# Patient Record
Sex: FEMALE | Race: WHITE | NOT HISPANIC OR LATINO | Employment: FULL TIME | URBAN - METROPOLITAN AREA
[De-identification: names, ages, dates, MRNs, and addresses within clinical notes are randomized per-mention and may not be internally consistent; named-entity substitution may affect disease eponyms.]

---

## 2024-08-22 ENCOUNTER — TELEPHONE (OUTPATIENT)
Dept: SURGERY | Facility: CLINIC | Age: 49
End: 2024-08-22
Payer: OTHER GOVERNMENT

## 2024-08-22 NOTE — TELEPHONE ENCOUNTER
Left message offering sooner appt on 9/9 at 2:20p for patient. Patient is new to [facility] and we will need medical records, surgical and colonoscopy records.

## 2024-08-23 ENCOUNTER — TELEPHONE (OUTPATIENT)
Dept: SURGERY | Facility: CLINIC | Age: 49
End: 2024-08-23
Payer: OTHER GOVERNMENT

## 2024-08-23 NOTE — TELEPHONE ENCOUNTER
----- Message from Emanuel Macias sent at 8/23/2024 10:18 AM CDT -----  Regarding: Missed call  Contact: [phone]  Caller is returning a missed called from office. She said she will take the earlier appointment on 09-09-24 Please call back as soon as possible.

## 2024-09-06 ENCOUNTER — TELEPHONE (OUTPATIENT)
Dept: SURGERY | Facility: CLINIC | Age: 49
End: 2024-09-06
Payer: OTHER GOVERNMENT

## 2024-09-06 NOTE — TELEPHONE ENCOUNTER
Called patient on 9/6/24 to confirm her appointment with Dr. Thornton on 9/9/24 at 2:20 PM in [facility] ([facility], 4th floor). Patient expressed understanding of appointment's date, time and location. Patient stated she would be there.

## 2024-09-09 ENCOUNTER — LAB VISIT (OUTPATIENT)
Dept: LAB | Facility: HOSPITAL | Age: 49
End: 2024-09-09
Attending: COLON & RECTAL SURGERY
Payer: OTHER GOVERNMENT

## 2024-09-09 ENCOUNTER — OFFICE VISIT (OUTPATIENT)
Dept: SURGERY | Facility: CLINIC | Age: 49
End: 2024-09-09
Payer: OTHER GOVERNMENT

## 2024-09-09 ENCOUNTER — PATIENT MESSAGE (OUTPATIENT)
Dept: SURGERY | Facility: CLINIC | Age: 49
End: 2024-09-09
Payer: OTHER GOVERNMENT

## 2024-09-09 VITALS
WEIGHT: 161.19 LBS | BODY MASS INDEX: 23.07 KG/M2 | RESPIRATION RATE: 16 BRPM | DIASTOLIC BLOOD PRESSURE: 72 MMHG | HEIGHT: 70 IN | SYSTOLIC BLOOD PRESSURE: 122 MMHG | HEART RATE: 97 BPM

## 2024-09-09 DIAGNOSIS — Z87.19 HISTORY OF DIVERTICULITIS: Primary | ICD-10-CM

## 2024-09-09 DIAGNOSIS — Z87.19 HISTORY OF DIVERTICULITIS: ICD-10-CM

## 2024-09-09 DIAGNOSIS — R10.2 PELVIC PAIN: ICD-10-CM

## 2024-09-09 LAB
ALBUMIN SERPL BCP-MCNC: 4.1 G/DL (ref 3.5–5.2)
ALP SERPL-CCNC: 50 U/L (ref 55–135)
ALT SERPL W/O P-5'-P-CCNC: 17 U/L (ref 10–44)
ANION GAP SERPL CALC-SCNC: 9 MMOL/L (ref 8–16)
AST SERPL-CCNC: 18 U/L (ref 10–40)
BASOPHILS # BLD AUTO: 0.06 K/UL (ref 0–0.2)
BASOPHILS NFR BLD: 0.7 % (ref 0–1.9)
BILIRUB SERPL-MCNC: 0.6 MG/DL (ref 0.1–1)
BUN SERPL-MCNC: 13 MG/DL (ref 6–20)
CALCIUM SERPL-MCNC: 9.4 MG/DL (ref 8.7–10.5)
CHLORIDE SERPL-SCNC: 107 MMOL/L (ref 95–110)
CO2 SERPL-SCNC: 24 MMOL/L (ref 23–29)
CREAT SERPL-MCNC: 0.8 MG/DL (ref 0.5–1.4)
CRP SERPL-MCNC: <0.3 MG/L (ref 0–8.2)
DIFFERENTIAL METHOD BLD: NORMAL
EOSINOPHIL # BLD AUTO: 0.2 K/UL (ref 0–0.5)
EOSINOPHIL NFR BLD: 2.3 % (ref 0–8)
ERYTHROCYTE [DISTWIDTH] IN BLOOD BY AUTOMATED COUNT: 12.7 % (ref 11.5–14.5)
EST. GFR  (NO RACE VARIABLE): >60 ML/MIN/1.73 M^2
GLUCOSE SERPL-MCNC: 82 MG/DL (ref 70–110)
HCT VFR BLD AUTO: 40 % (ref 37–48.5)
HGB BLD-MCNC: 13.4 G/DL (ref 12–16)
IMM GRANULOCYTES # BLD AUTO: 0.02 K/UL (ref 0–0.04)
IMM GRANULOCYTES NFR BLD AUTO: 0.2 % (ref 0–0.5)
LYMPHOCYTES # BLD AUTO: 2.5 K/UL (ref 1–4.8)
LYMPHOCYTES NFR BLD: 28.4 % (ref 18–48)
MCH RBC QN AUTO: 30 PG (ref 27–31)
MCHC RBC AUTO-ENTMCNC: 33.5 G/DL (ref 32–36)
MCV RBC AUTO: 90 FL (ref 82–98)
MONOCYTES # BLD AUTO: 0.8 K/UL (ref 0.3–1)
MONOCYTES NFR BLD: 9 % (ref 4–15)
NEUTROPHILS # BLD AUTO: 5.2 K/UL (ref 1.8–7.7)
NEUTROPHILS NFR BLD: 59.4 % (ref 38–73)
NRBC BLD-RTO: 0 /100 WBC
PLATELET # BLD AUTO: 271 K/UL (ref 150–450)
PMV BLD AUTO: 11 FL (ref 9.2–12.9)
POTASSIUM SERPL-SCNC: 3.8 MMOL/L (ref 3.5–5.1)
PROT SERPL-MCNC: 6.7 G/DL (ref 6–8.4)
RBC # BLD AUTO: 4.46 M/UL (ref 4–5.4)
SODIUM SERPL-SCNC: 140 MMOL/L (ref 136–145)
WBC # BLD AUTO: 8.7 K/UL (ref 3.9–12.7)

## 2024-09-09 PROCEDURE — 99213 OFFICE O/P EST LOW 20 MIN: CPT | Mod: PBBFAC | Performed by: COLON & RECTAL SURGERY

## 2024-09-09 PROCEDURE — 85025 COMPLETE CBC W/AUTO DIFF WBC: CPT | Performed by: COLON & RECTAL SURGERY

## 2024-09-09 PROCEDURE — 99203 OFFICE O/P NEW LOW 30 MIN: CPT | Mod: S$PBB,,, | Performed by: COLON & RECTAL SURGERY

## 2024-09-09 PROCEDURE — 99999 PR PBB SHADOW E&M-EST. PATIENT-LVL III: CPT | Mod: PBBFAC,,, | Performed by: COLON & RECTAL SURGERY

## 2024-09-09 PROCEDURE — 80053 COMPREHEN METABOLIC PANEL: CPT | Performed by: COLON & RECTAL SURGERY

## 2024-09-09 PROCEDURE — 36415 COLL VENOUS BLD VENIPUNCTURE: CPT | Performed by: COLON & RECTAL SURGERY

## 2024-09-09 PROCEDURE — 86140 C-REACTIVE PROTEIN: CPT | Performed by: COLON & RECTAL SURGERY

## 2024-09-09 RX ORDER — SEMAGLUTIDE 1 MG/.5ML
1 INJECTION, SOLUTION SUBCUTANEOUS
COMMUNITY
Start: 2024-07-18

## 2024-09-09 RX ORDER — EZETIMIBE 10 MG/1
TABLET ORAL
COMMUNITY
Start: 2024-02-19

## 2024-09-09 RX ORDER — TIZANIDINE 4 MG/1
4 TABLET ORAL 2 TIMES DAILY PRN
COMMUNITY
Start: 2024-08-13

## 2024-09-09 RX ORDER — ATORVASTATIN CALCIUM 40 MG/1
TABLET, FILM COATED ORAL
COMMUNITY

## 2024-09-09 NOTE — PROGRESS NOTES
"CRS Office Visit    SUBJECTIVE:     Chief Complaint: ? Anastomotic Leak    History of Present Illness:  Patient is a 48 y.o. female presents with abdominal pain and ahistory of a large diverticulum vs. Chronic anastomotic leak from a prior colectomy. The patient is a new patient to this practice.     Her story began in 2017 when she was suffering from recurrent diverticulitis boughts ultimately leading to a laparoscopic sigmoid colectomy with Dr. Beltran in [place], [place]. She recovered well from that surgery and then underwent a tubal ligation in 2019, where a "large cyst" was noted at the level of the colorectal anastomosis, for which she was referred to a general surgeon for further evaluation. Two years later, she underwent evaluation with a CT scan and barium enema, which revealed a 4x2 cm diverticulum vs. Walled off anastomotic leak vs. Baker-type anastomosis. The surgeon performed a flexible sigmoidoscopy, which revealed a possible side to end anastomosis, but was not able to visualize the large diverticulum.     Given that the patient was asymptomatic through all of this, she then left things alone for the following years, until early August 2024 when she experienced a change and began having LLQ and back pain, feelings of tenesmus, pelvic pressure, and abdominal discomfort. She has continued to have normal bowel movements and is not constipated. She denies nausea, vomiting, changes in weight, fevers, chills, or other constitutional symptoms.     She presents today for further evaluation for this issue with copies of records from the outside institution.     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  No intraperitoneal air in indicate perforation.  Diverticulosis with wall thickening in the " "descending and sigmoid colon as well as the rectum with no adjacent stranding.  This is likely scarring however mild aspect of colitis and proctitis cannot be excluded.     Barium Enema 7/7/21: (report only, no images)  Large diverticulum vs postsurgical defect arising from the rectosigmoid junction to the left of midline  Mild diverticulosis    Flex sig 7/21/21 - Anastomosis with end-to side appearance  Review of patient's allergies indicates:   Allergen Reactions    Lidocaine Other (See Comments)     Other Reaction(s): Unknown    Pt states can take Esters but not Amides    Pt states can take Esters but not Amides      Problems with local and topical lidocaine - low BP, swelling.  No problem with general anesthesia, "Ok to use Nesters"    Problems with local and topical lidocaine - low BP, swelling.  No problem with general anesthesia, "Ok to use Nesters"    Pregabalin      Other Reaction(s): Unknown    Procaine Other (See Comments)     Pt states can take Esters but not Amides    Sulfa (sulfonamide antibiotics) Other (See Comments)    Vortioxetine Other (See Comments)       Past Medical History:   Diagnosis Date    Diverticulitis large intestine      Past Surgical History:   Procedure Laterality Date    COLONOSCOPY  2017    laparoscopic sigmoid colectomy  2017    LAPAROSCOPIC TUBAL LIGATION W/ FALOPE RING  2019     No family history on file.  Social History     Tobacco Use    Smoking status: Never    Smokeless tobacco: Never        Review of Systems:  Constitutional: No Weight Change, No Fever, No Chills, No Night Sweats, No Fatigue, No Malaise    ENT/Mouth: No Hearing Changes, No Ear Pain, No Nasal Congestion, No Sinus Pain, No Hoarseness, No sore throat, No Rhinorrhea, No Swallowing Difficulty    Eyes: No Eye Pain, No Swelling, No Redness, No Foreign Body, No Discharge, No Vision Changes    Cardiovascular: No Chest Pain, No SOB, No PND, No Dyspnea on Exertion, No Orthopnea, No Claudication, No Edema, No " Palpitations    Respiratory: No Cough, No Sputum, No Wheezing, No Smoke Exposure, No Dyspnea    Gastrointestinal: No Nausea, No Vomiting, No Diarrhea, No Constipation, No Heartburn, No Anorexia, No Dysphagia, No Hematochezia, No Melena, No Flatulence, No Jaundice    Genitourinary: No Dysmenorrhea, No DUB, No Dyspareunia, No Dysuria, No Urinary Frequency, No Hematuria, No Urinary Incontinence, No Urgency, No Flank Pain, No Urinary Flow Changes, No Hesitancy    Musculoskeletal: No Arthralgias, No Myalgias, No Joint Swelling, No Joint Stiffness, No Back Pain, No Neck Pain, No Injury History    Skin: No Skin Lesions, No Pruritis, No Hair Changes, No Breast/Skin Changes, No Nipple Discharge    Neuro: No Weakness, No Numbness, No Paresthesias, No Loss of Consciousness, No Syncope, No Dizziness, No Headache, No Coordination Changes, No Recent Falls    Psych: No Anxiety/Panic, No Depression, No Insomnia, No Personality Changes, No Delusions, No Rumination, No SI/HI/AH/VH, No Social Issues, No Memory Changes, No Violence/Abuse Hx.,   Heme/Lymph: No Bruising, No Bleeding, No Transfusions History, No Lymphadenopathy    Endocrine: No Polyuria, No Polydipsia, No Temperature Intolerance       OBJECTIVE:     Vital Signs (Most Recent)  Pulse: 97 (09/09/24 1412)  Resp: 16 (09/09/24 1412)  BP: 122/72 (09/09/24 1412)    Physical Exam:  General: White female in NAD sitting in chair in clinic  Neuro: aaox4 maex4 perrl  Respiratory: resps even unlabored  Cardiac: cap refill <2 sec  Abdomen: Abdomen soft, nontender. BS normal. No masses, organomegaly, surgical sites well healed.  Extremities: Warm dry and intact  Anorectal: deferred    ASSESSMENT/PLAN:     Diagnoses and all orders for this visit:    History of diverticulitis    Pelvic pain    48 F with a hx of a sigmoid colectomy, reportedly end-end reconstruction, with a 4x2cm diverticulum vs. Walled-off leak vs. Not an end-end but side-end anastomosis, and abdominal complaints.    -CT  AP with rectal and IV contrast  -Repeat Colonoscopy pending CT results  -Return to clinic pending further studies     Shabbir Fernandez MD  CRS Fellow    I have interviewed and examined the patient, reviewed the notes and assessments, and/or personally supervised the procedure(s) performed by Dr. Fernandez, and I concur with her/his documentation of Dionicio DeGeorge.  See below addendum for my evaluation and additional findings.    Check labs today,  Repeat CT A/P with rectal contrast to try to delineate anatomy  Repeat colonoscopy pending CT results.    Bryce Thornton MD, FACS, FASCRS  , Department of Colon & Rectal Surgery

## 2024-09-13 ENCOUNTER — HOSPITAL ENCOUNTER (OUTPATIENT)
Dept: RADIOLOGY | Facility: HOSPITAL | Age: 49
Discharge: HOME | End: 2024-09-13
Attending: COLON & RECTAL SURGERY
Payer: OTHER GOVERNMENT

## 2024-09-13 DIAGNOSIS — R10.2 PELVIC PAIN: ICD-10-CM

## 2024-09-13 DIAGNOSIS — Z87.19 HISTORY OF DIVERTICULITIS: ICD-10-CM

## 2024-09-13 PROCEDURE — 74177 CT ABD & PELVIS W/CONTRAST: CPT | Mod: TC

## 2024-09-13 PROCEDURE — 74177 CT ABD & PELVIS W/CONTRAST: CPT | Mod: 26,,, | Performed by: RADIOLOGY

## 2024-09-13 PROCEDURE — 25500020 PHARM REV CODE 255: Performed by: COLON & RECTAL SURGERY

## 2024-09-13 RX ADMIN — IOHEXOL 75 ML: 350 INJECTION, SOLUTION INTRAVENOUS at 05:09

## 2024-09-17 ENCOUNTER — PATIENT MESSAGE (OUTPATIENT)
Dept: SURGERY | Facility: HOSPITAL | Age: 49
End: 2024-09-17
Payer: OTHER GOVERNMENT

## 2024-09-18 ENCOUNTER — TELEPHONE (OUTPATIENT)
Dept: ENDOSCOPY | Facility: HOSPITAL | Age: 49
End: 2024-09-18
Payer: OTHER GOVERNMENT

## 2024-09-18 VITALS — BODY MASS INDEX: 22.9 KG/M2 | WEIGHT: 160 LBS | HEIGHT: 70 IN

## 2024-09-18 DIAGNOSIS — K57.32 SIGMOID DIVERTICULITIS: ICD-10-CM

## 2024-09-18 DIAGNOSIS — Z12.11 SCREEN FOR COLON CANCER: Primary | ICD-10-CM

## 2024-09-18 DIAGNOSIS — K57.92 DIVERTICULITIS: ICD-10-CM

## 2024-09-18 DIAGNOSIS — R10.30 LOWER ABDOMINAL PAIN: Primary | ICD-10-CM

## 2024-09-18 NOTE — TELEPHONE ENCOUNTER
Spoke to Dionicio to schedule procedure(s) Colonoscopy       Physician to perform procedure(s) Dr. RADHA Thornton  Date of Procedure (s) 11/21/24  Arrival Time 11:40 AM  Time of Procedure(s) 12:40 PM   Location of Procedure(s) [facility] 4th Floor  Type of Rx Prep sent to patient: PEG  Instructions provided to patient via [facility]    Patient was informed on the following information and verbalized understanding. Screening questionnaire reviewed with patient and complete. If procedure requires anesthesia, a responsible adult needs to be present to accompany the patient home, patient cannot drive after receiving anesthesia. Appointment details are tentative, especially check-in time. Patient will receive a prep-op call 7 days prior to confirm check-in time for procedure. If applicable the patient should contact their pharmacy to verify Rx for procedure prep is ready for pick-up. Patient was advised to call the scheduling department at [phone] if pharmacy states no Rx is available. Patient was advised to call the endoscopy scheduling department if any questions or concerns arise.      [facility] Scheduling Department

## 2024-09-18 NOTE — TELEPHONE ENCOUNTER
"----- Message from Marva Johnson sent at 2024  9:00 AM CDT -----    ----- Message -----  From: Bryce Thornton MD  Sent: 2024   2:35 PM CDT  To: [facility]    Procedure: Colonoscopy    Diagnosis: History of sigmoid colectomy for diverticulitis, lower abdominal pain    Procedure Timin-12 weeks    #If within 4 weeks selected, please lewis as high priority#    #If greater than 12 weeks, please select "5-12 weeks" and delay sending until 3 months prior to requested date#     Location: Any Site    Additional Scheduling Information: No scheduling concerns    Prep Specifications:Standard prep    Is the patient taking a GLP-1 Agonist:no    Have you attached a patient to this message: yes  "

## 2024-09-24 ENCOUNTER — PATIENT MESSAGE (OUTPATIENT)
Dept: FAMILY MEDICINE | Facility: CLINIC | Age: 49
End: 2024-09-24
Payer: OTHER GOVERNMENT

## 2024-11-14 ENCOUNTER — PATIENT MESSAGE (OUTPATIENT)
Dept: ENDOSCOPY | Facility: HOSPITAL | Age: 49
End: 2024-11-14
Payer: OTHER GOVERNMENT

## 2024-11-21 ENCOUNTER — ANESTHESIA (OUTPATIENT)
Dept: ENDOSCOPY | Facility: HOSPITAL | Age: 49
End: 2024-11-21
Payer: OTHER GOVERNMENT

## 2024-11-21 ENCOUNTER — ANESTHESIA EVENT (OUTPATIENT)
Dept: ENDOSCOPY | Facility: HOSPITAL | Age: 49
End: 2024-11-21
Payer: OTHER GOVERNMENT

## 2024-11-21 ENCOUNTER — HOSPITAL ENCOUNTER (OUTPATIENT)
Facility: HOSPITAL | Age: 49
Discharge: HOME | End: 2024-11-21
Attending: COLON & RECTAL SURGERY | Admitting: COLON & RECTAL SURGERY
Payer: OTHER GOVERNMENT

## 2024-11-21 VITALS
OXYGEN SATURATION: 100 % | RESPIRATION RATE: 17 BRPM | DIASTOLIC BLOOD PRESSURE: 55 MMHG | SYSTOLIC BLOOD PRESSURE: 117 MMHG | HEART RATE: 76 BPM | BODY MASS INDEX: 23.34 KG/M2 | HEIGHT: 70 IN | TEMPERATURE: 98 F | WEIGHT: 163 LBS

## 2024-11-21 DIAGNOSIS — K57.30 DIVERTICULAR DISEASE OF COLON: Primary | ICD-10-CM

## 2024-11-21 DIAGNOSIS — Z12.11 COLON CANCER SCREENING: ICD-10-CM

## 2024-11-21 LAB
B-HCG UR QL: NEGATIVE
CTP QC/QA: YES

## 2024-11-21 PROCEDURE — 45380 COLONOSCOPY AND BIOPSY: CPT | Mod: 33,,, | Performed by: COLON & RECTAL SURGERY

## 2024-11-21 PROCEDURE — 27201012 HC FORCEPS, HOT/COLD, DISP: Performed by: COLON & RECTAL SURGERY

## 2024-11-21 PROCEDURE — 37000008 HC ANESTHESIA 1ST 15 MINUTES: Performed by: COLON & RECTAL SURGERY

## 2024-11-21 PROCEDURE — 88305 TISSUE EXAM BY PATHOLOGIST: CPT | Performed by: PATHOLOGY

## 2024-11-21 PROCEDURE — 63600175 PHARM REV CODE 636 W HCPCS: Performed by: NURSE ANESTHETIST, CERTIFIED REGISTERED

## 2024-11-21 PROCEDURE — 81025 URINE PREGNANCY TEST: CPT | Performed by: COLON & RECTAL SURGERY

## 2024-11-21 PROCEDURE — 25000003 PHARM REV CODE 250: Performed by: NURSE ANESTHETIST, CERTIFIED REGISTERED

## 2024-11-21 PROCEDURE — 37000009 HC ANESTHESIA EA ADD 15 MINS: Performed by: COLON & RECTAL SURGERY

## 2024-11-21 PROCEDURE — 45380 COLONOSCOPY AND BIOPSY: CPT | Mod: PT | Performed by: COLON & RECTAL SURGERY

## 2024-11-21 RX ORDER — PROPOFOL 10 MG/ML
VIAL (ML) INTRAVENOUS
Status: DISCONTINUED | OUTPATIENT
Start: 2024-11-21 | End: 2024-11-21

## 2024-11-21 RX ORDER — SODIUM CHLORIDE 9 MG/ML
INJECTION, SOLUTION INTRAVENOUS CONTINUOUS
Status: DISCONTINUED | OUTPATIENT
Start: 2024-11-21 | End: 2024-11-21 | Stop reason: HOSPADM

## 2024-11-21 RX ADMIN — PROPOFOL 120 MG: 10 INJECTION, EMULSION INTRAVENOUS at 12:11

## 2024-11-21 RX ADMIN — SODIUM CHLORIDE: 0.9 INJECTION, SOLUTION INTRAVENOUS at 11:11

## 2024-11-21 RX ADMIN — PROPOFOL 200 MCG/KG/MIN: 10 INJECTION, EMULSION INTRAVENOUS at 12:11

## 2024-11-21 NOTE — TRANSFER OF CARE
"Anesthesia Transfer of Care Note    Patient: Dionicio DeGeorge    Procedure(s) Performed: Procedure(s) (LRB):  COLONOSCOPY (N/A)    Patient location: GI    Anesthesia Type: general    Transport from OR: Transported from OR on room air with adequate spontaneous ventilation    Post pain: adequate analgesia    Post assessment: no apparent anesthetic complications    Post vital signs: stable    Level of consciousness: awake    Nausea/Vomiting: no nausea/vomiting    Complications: none    Transfer of care protocol was followed      Last vitals: Visit Vitals  BP (!) 106/59 (BP Location: Left arm, Patient Position: Lying)   Pulse 104   Temp 36.7 °C (98.1 °F) (Skin)   Resp 18   Ht 5' 10" (1.778 m)   Wt 73.9 kg (163 lb)   SpO2 100%   Breastfeeding No   BMI 23.39 kg/m²     "

## 2024-11-21 NOTE — PROVATION PATIENT INSTRUCTIONS
Discharge Summary/Instructions after an Endoscopic Procedure  Patient Name: Dionicio Juarez  Patient MRN: [id number]  Patient YOB: 1975 Thursday, November 21, 2024  Bryce Thornton MD  Dear patient,  As a result of recent federal legislation (The Federal Cures Act), you may   receive lab or pathology results from your procedure in your [facility]   account before your physician is able to contact you. Your physician or   their representative will relay the results to you with their   recommendations at their soonest availability.  Thank you,  RESTRICTIONS:  During your procedure today, you received medications for sedation.  These   medications may affect your judgment, balance and coordination.  Therefore,   for 24 hours, you have the following restrictions:   - DO NOT drive a car, operate machinery, make legal/financial decisions,   sign important papers or drink alcohol.    ACTIVITY:  Today: no heavy lifting, straining or running due to procedural   sedation/anesthesia.  The following day: return to full activity including work.  DIET:  Eat and drink normally unless instructed otherwise.     TREATMENT FOR COMMON SIDE EFFECTS:  - Mild abdominal pain, nausea, belching, bloating or excessive gas:  rest,   eat lightly and use a heating pad.  - Sore Throat: treat with throat lozenges and/or gargle with warm salt   water.  - Because air was used during the procedure, expelling large amounts of air   from your rectum or belching is normal.  - If a bowel prep was taken, you may not have a bowel movement for 1-3 days.    This is normal.  SYMPTOMS TO WATCH FOR AND REPORT TO YOUR PHYSICIAN:  1. Abdominal pain or bloating, other than gas cramps.  2. Chest pain.  3. Back pain.  4. Signs of infection such as: chills or fever occurring within 24 hours   after the procedure.  5. Rectal bleeding, which would show as bright red, maroon, or black stools.   (A tablespoon of blood from the rectum is not serious, especially  if   hemorrhoids are present.)  6. Vomiting.  7. Weakness or dizziness.  GO DIRECTLY TO THE NEAREST EMERGENCY ROOM IF YOU HAVE ANY OF THE FOLLOWING:      Difficulty breathing              Chills and/or fever over 101 F   Persistent vomiting and/or vomiting blood   Severe abdominal pain   Severe chest pain   Black, tarry stools   Bleeding- more than one tablespoon   Any other symptom or condition that you feel may need urgent attention  Your doctor recommends these additional instructions:  If any biopsies were taken, your doctors clinic will contact you in 1 to 2   weeks with any results.  - Discharge patient to home.   - High fiber diet.   - Continue present medications.   - Await pathology results.   - Patient has a contact number available for emergencies.  The signs and   symptoms of potential delayed complications were discussed with the   patient.  Return to normal activities tomorrow.  Written discharge   instructions were provided to the patient.   - Repeat colonoscopy date to be determined after pending pathology results   are reviewed for surveillance based on pathology results.  For questions, problems or results please call your physician - Bryce Thornton MD at Work:  [phone].  [facility], EMERGENCY ROOM PHONE NUMBER: [phone]  IF A COMPLICATION OR EMERGENCY SITUATION ARISES AND YOU ARE UNABLE TO REACH   YOUR PHYSICIAN - GO DIRECTLY TO THE EMERGENCY ROOM.  Bryce Thornton MD  11/21/2024 12:28:28 PM  This report has been verified and signed electronically.  Dear patient,  As a result of recent federal legislation (The Federal Cures Act), you may   receive lab or pathology results from your procedure in your [facility]   account before your physician is able to contact you. Your physician or   their representative will relay the results to you with their   recommendations at their soonest availability.  Thank you,  PROVATION

## 2024-11-21 NOTE — H&P
"Procedure : Colonoscopy    Indication(s):  Follow-up after surgery for diverticular disease    Last colonoscopy: 2017 prior to colectomy    Review of patient's allergies indicates:   Allergen Reactions    Lidocaine Other (See Comments)     Other Reaction(s): Unknown    Pt states can take Esters but not Amides    Pt states can take Esters but not Amides      Problems with local and topical lidocaine - low BP, swelling.  No problem with general anesthesia, "Ok to use Nesters"    Problems with local and topical lidocaine - low BP, swelling.  No problem with general anesthesia, "Ok to use Nesters"    Pregabalin      Other Reaction(s): Unknown    Procaine Other (See Comments)     Pt states can take Esters but not Amides    Sulfa (sulfonamide antibiotics) Other (See Comments)    Vortioxetine Other (See Comments)       Past Medical History:   Diagnosis Date    Diverticulitis large intestine        Prior to Admission medications    Medication Sig Start Date End Date Taking? Authorizing Provider   ezetimibe (ZETIA) 10 mg tablet  2/19/24   Provider, Historical   LIPITOR 40 mg tablet     Provider, Historical   tiZANidine (ZANAFLEX) 4 MG tablet Take 4 mg by mouth 2 (two) times daily as needed.  Patient not taking: Reported on 9/9/2024 8/13/24   Provider, Historical   WEGOVY 1 mg/0.5 mL PnIj Inject 1 mg into the skin. 7/18/24   Provider, Historical       Sedation Problems: NO    No family history on file.    Fam Hx of Sedation Problems: NO    Social History     Socioeconomic History    Marital status:    Tobacco Use    Smoking status: Never    Smokeless tobacco: Never     Social Drivers of Health     Financial Resource Strain: Low Risk  (9/2/2024)    Overall Financial Resource Strain (CARDIA)     Difficulty of Paying Living Expenses: Not hard at all   Food Insecurity: No Food Insecurity (9/2/2024)    Hunger Vital Sign     Worried About Running Out of Food in the Last Year: Never true     Ran Out of Food in the Last Year: " Never true   Physical Activity: Insufficiently Active (9/2/2024)    Exercise Vital Sign     Days of Exercise per Week: 3 days     Minutes of Exercise per Session: 30 min   Stress: No Stress Concern Present (9/2/2024)    [employer] - Occupational Stress Questionnaire     Feeling of Stress : Not at all   Housing Stability: Unknown (9/2/2024)    Housing Stability Vital Sign     Unable to Pay for Housing in the Last Year: No       Review of Systems -     Respiratory ROS: no cough, shortness of breath, or wheezing  Cardiovascular ROS: no chest pain or dyspnea on exertion  Gastrointestinal ROS: no abdominal pain, change in bowel habits, or black or bloody stools  Musculoskeletal ROS: negative  Neurological ROS: no TIA or stroke symptoms        Physical Exam:  General: no distress  Head: normocephalic  Airway:  normal oropharynx, airway normal  Neck: supple, symmetrical, trachea midline  Lungs:  normal respiratory effort  Heart: regular rate and rhythm  Abdomen: soft, non-tender non-distented; bowel sounds normal; no masses,  no organomegaly  Extremities: no cyanosis or edema, or clubbing       Deep Sedation: Mallampati Score per anesthesia     SedationPlan :Moderate     ASA : II    Patient is medically cleared for anesthesia.    Anesthesia/Surgery risks, benefits and alternative options discussed and understood by patient/family.

## 2024-11-21 NOTE — ANESTHESIA PREPROCEDURE EVALUATION
"[facility]  Anesthesia Pre-Operative Evaluation     Patient Name: Dionicio Rollins  YOB: 1975  MRN: [id number]  [facility]: [id number]       Admit Date: (Not on file)   Admit Team: Networked reference to record PCT      Date of Procedure: 11/21/2024  Anesthesia: Choice Procedure: Procedure(s) (LRB):  COLONOSCOPY (N/A)  Pre-Operative Diagnosis: Lower abdominal pain [R10.30]  Sigmoid diverticulitis [K57.32]  Proceduralist:Surgeons and Role:     * Bryce Thornton MD - Primary  Code Status: No Order   Advanced Directive: <no information>  Isolation Precautions: No active isolations  Capacity: Full capacity     SUBJECTIVE:   Dionicio Rollins is a 49 y.o. female who  has a past medical history of Diverticulitis large intestine.  No notes on file    Hospital LOS: 0 days  ICU LOS: Patient does not have an ICU stay during this admission.    she has a current medication list which includes the following long-term medication(s): ezetimibe and lipitor.   Current Outpatient Medications   Medication Instructions    ezetimibe (ZETIA) 10 mg tablet     LIPITOR 40 mg tablet     tiZANidine (ZANAFLEX) 4 mg, 2 times daily PRN    WEGOVY 1 mg, Subcutaneous     ALLERGIES:     Review of patient's allergies indicates:   Allergen Reactions    Lidocaine Other (See Comments)     Other Reaction(s): Unknown    Pt states can take Esters but not Amides    Pt states can take Esters but not Amides      Problems with local and topical lidocaine - low BP, swelling.  No problem with general anesthesia, "Ok to use Nesters"    Problems with local and topical lidocaine - low BP, swelling.  No problem with general anesthesia, "Ok to use Nesters"    Pregabalin      Other Reaction(s): Unknown    Procaine Other (See Comments)     Pt states can take Esters but not Amides    Sulfa (sulfonamide antibiotics) Other (See Comments)    Vortioxetine Other (See Comments)     LDA:   AIRWAY:         [unfilled]     Lines/Drains/Airways  "      None                  Anesthesia Evaluation      Airway   Mallampati: II  TM distance: Normal  Neck ROM: Normal ROM  Dental    (+) Intact    Pulmonary    Cardiovascular   Exercise tolerance: good    Neuro/Psych      GI/Hepatic/Renal      Endo/Other    Abdominal                MEDICATIONS:     Current Outpatient Medications on File Prior to Encounter   Medication Sig Dispense Refill Last Dose/Taking    ezetimibe (ZETIA) 10 mg tablet        LIPITOR 40 mg tablet        tiZANidine (ZANAFLEX) 4 MG tablet Take 4 mg by mouth 2 (two) times daily as needed. (Patient not taking: Reported on 9/9/2024)       WEGOVY 1 mg/0.5 mL PnIj Inject 1 mg into the skin.         Inpatient Medications:  Antibiotics (From admission, onward)      None          VTE Risk Mitigation (From admission, onward)      None              No current facility-administered medications for this encounter.     Current Outpatient Medications   Medication Sig Dispense Refill    ezetimibe (ZETIA) 10 mg tablet       LIPITOR 40 mg tablet       tiZANidine (ZANAFLEX) 4 MG tablet Take 4 mg by mouth 2 (two) times daily as needed. (Patient not taking: Reported on 9/9/2024)      WEGOVY 1 mg/0.5 mL PnIj Inject 1 mg into the skin.            History:   There are no hospital problems to display for this patient.    Surgical History:    has a past surgical history that includes laparoscopic sigmoid colectomy (2017); Laparoscopic tubal ligation w/ Falope ring (2019); and Colonoscopy (2017).   Social History:    has no history on file for sexual activity.  reports that she has never smoked. She has never used smokeless tobacco.    There were no vitals filed for this visit.  Vital Signs Range (Last 24H):       There is no height or weight on file to calculate BMI.  Wt Readings from Last 4 Encounters:   09/18/24 72.6 kg (160 lb)   09/09/24 73.1 kg (161 lb 2.5 oz)        Intake/Output - Last 3 Shifts       None          Lab Results   Component Value Date    WBC 8.70  "09/09/2024    HGB 13.4 09/09/2024    HCT 40.0 09/09/2024     09/09/2024     09/09/2024    K 3.8 09/09/2024     09/09/2024    CREATININE 0.8 09/09/2024    BUN 13 09/09/2024    CO2 24 09/09/2024    GLU 82 09/09/2024    CALCIUM 9.4 09/09/2024    ALKPHOS 50 (L) 09/09/2024    ALT 17 09/09/2024    AST 18 09/09/2024    ALBUMIN 4.1 09/09/2024     No results found for this or any previous visit (from the past 12 hours).  No results for input(s): "WBC", "HGB", "HCT", "PLT", "NA", "K", "CREATININE", "GLU", "INR", "LACTATE", "5HIAAPLASMA", "5HIAAURINT", "5HIAA", "3FPOV15QG" in the last 168 hours.  No LMP recorded.    EKG:   No results found for this or any previous visit.  TTE:  No results found for this or any previous visit.    MADISON:  No results found for this or any previous visit.    Stress Test:  No results found for this or any previous visit.    No results found for this or any previous visit.    LHC:  No results found for this or any previous visit.    Cardiac Device Check   No results found for this or any previous visit.    No results found for this or any previous visit.                                                                                                                 11/21/2024  Dionicio Rollins is a 49 y.o., female.      Pre-op Assessment    I have reviewed the Patient Summary Reports.       I have reviewed the Medications.     Review of Systems  Anesthesia Hx:  No problems with previous Anesthesia   History of prior surgery of interest to airway management or planning:  Previous anesthesia: General           Social:  Non-Smoker       Cardiovascular:  Cardiovascular Normal Exercise tolerance: good                                                 Physical Exam  General: Well nourished, Cooperative, Alert and Oriented    Airway:  Mallampati: II   Mouth Opening: Normal  TM Distance: Normal  Tongue: Normal  Neck ROM: Normal ROM    Dental:  Intact    Anesthesia Plan  Type of Anesthesia, risks " & benefits discussed:    Anesthesia Type: Gen Natural Airway  Intra-op Monitoring Plan: Standard ASA Monitors  Post Op Pain Control Plan: multimodal analgesia and IV/PO Opioids PRN  Induction:  IV  Informed Consent: Informed consent signed with the Patient and all parties understand the risks and agree with anesthesia plan.  All questions answered.   ASA Score: 1    Ready For Surgery From Anesthesia Perspective.   .

## 2024-11-21 NOTE — ANESTHESIA POSTPROCEDURE EVALUATION
Anesthesia Post Evaluation    Patient: Dionicio DeGeorge    Procedure(s) Performed: Procedure(s) (LRB):  COLONOSCOPY (N/A)    Final Anesthesia Type: general      Patient location during evaluation: PACU  Patient participation: Yes- Able to Participate  Level of consciousness: awake and alert  Post-procedure vital signs: reviewed and stable  Pain management: adequate  Airway patency: patent    PONV status at discharge: No PONV  Anesthetic complications: no      Cardiovascular status: blood pressure returned to baseline  Respiratory status: unassisted  Hydration status: euvolemic  Follow-up not needed.              Vitals Value Taken Time   /55 11/21/24 1303   Temp 36.7 °C (98.1 °F) 11/21/24 1233   Pulse 76 11/21/24 1303   Resp 17 11/21/24 1303   SpO2 100 % 11/21/24 1303         Event Time   Out of Recovery 13:12:44         Pain/Rafi Score: Rafi Score: 10 (11/21/2024 12:48 PM)

## 2024-11-26 LAB
FINAL PATHOLOGIC DIAGNOSIS: NORMAL
GROSS: NORMAL
Lab: NORMAL

## 2024-12-02 ENCOUNTER — DOCUMENTATION ONLY (OUTPATIENT)
Dept: SURGERY | Facility: CLINIC | Age: 49
End: 2024-12-02
Payer: OTHER GOVERNMENT

## 2024-12-03 NOTE — PROGRESS NOTES
Pathology reveals adenoma(s) less than 1 cm.  Recommend repeat colonoscopy 5 yrs for surveillance.  Please let patient know.  Thx.